# Patient Record
Sex: MALE | Race: WHITE | NOT HISPANIC OR LATINO | Employment: FULL TIME | URBAN - METROPOLITAN AREA
[De-identification: names, ages, dates, MRNs, and addresses within clinical notes are randomized per-mention and may not be internally consistent; named-entity substitution may affect disease eponyms.]

---

## 2017-07-26 ENCOUNTER — HOSPITAL ENCOUNTER (EMERGENCY)
Facility: HOSPITAL | Age: 47
Discharge: HOME | End: 2017-07-26
Attending: EMERGENCY MEDICINE
Payer: COMMERCIAL

## 2017-07-26 VITALS
WEIGHT: 260 LBS | TEMPERATURE: 98 F | RESPIRATION RATE: 12 BRPM | HEIGHT: 73 IN | SYSTOLIC BLOOD PRESSURE: 133 MMHG | OXYGEN SATURATION: 100 % | HEART RATE: 58 BPM | BODY MASS INDEX: 34.46 KG/M2 | DIASTOLIC BLOOD PRESSURE: 96 MMHG

## 2017-07-26 DIAGNOSIS — R07.9 CHEST PAIN: ICD-10-CM

## 2017-07-26 DIAGNOSIS — R07.9 CHEST PAIN, UNSPECIFIED TYPE: Primary | ICD-10-CM

## 2017-07-26 LAB
ALBUMIN SERPL BCP-MCNC: 4.2 G/DL
ALP SERPL-CCNC: 72 U/L
ALT SERPL W/O P-5'-P-CCNC: 31 U/L
ANION GAP SERPL CALC-SCNC: 7 MMOL/L
APTT BLDCRRT: 28.1 SEC
AST SERPL-CCNC: 25 U/L
BASOPHILS # BLD AUTO: 0.02 K/UL
BASOPHILS NFR BLD: 0.4 %
BILIRUB SERPL-MCNC: 0.5 MG/DL
BNP SERPL-MCNC: <10 PG/ML
BUN SERPL-MCNC: 17 MG/DL
CALCIUM SERPL-MCNC: 10.2 MG/DL
CHLORIDE SERPL-SCNC: 105 MMOL/L
CK SERPL-CCNC: 203 U/L
CO2 SERPL-SCNC: 29 MMOL/L
CREAT SERPL-MCNC: 0.8 MG/DL
DIFFERENTIAL METHOD: ABNORMAL
EOSINOPHIL # BLD AUTO: 0.1 K/UL
EOSINOPHIL NFR BLD: 1 %
ERYTHROCYTE [DISTWIDTH] IN BLOOD BY AUTOMATED COUNT: 12.5 %
EST. GFR  (AFRICAN AMERICAN): >60 ML/MIN/1.73 M^2
EST. GFR  (NON AFRICAN AMERICAN): >60 ML/MIN/1.73 M^2
GLUCOSE SERPL-MCNC: 93 MG/DL
HCT VFR BLD AUTO: 39.5 %
HGB BLD-MCNC: 13.6 G/DL
INR PPP: 1
LYMPHOCYTES # BLD AUTO: 2.2 K/UL
LYMPHOCYTES NFR BLD: 44.2 %
MAGNESIUM SERPL-MCNC: 2.1 MG/DL
MCH RBC QN AUTO: 30.9 PG
MCHC RBC AUTO-ENTMCNC: 34.4 G/DL
MCV RBC AUTO: 90 FL
MONOCYTES # BLD AUTO: 0.5 K/UL
MONOCYTES NFR BLD: 10.3 %
NEUTROPHILS # BLD AUTO: 2.2 K/UL
NEUTROPHILS NFR BLD: 44.1 %
PLATELET # BLD AUTO: 196 K/UL
PMV BLD AUTO: 10.3 FL
POTASSIUM SERPL-SCNC: 4.4 MMOL/L
PROT SERPL-MCNC: 7.4 G/DL
PROTHROMBIN TIME: 10.8 SEC
RBC # BLD AUTO: 4.4 M/UL
SODIUM SERPL-SCNC: 141 MMOL/L
TROPONIN I SERPL DL<=0.01 NG/ML-MCNC: 0.01 NG/ML
WBC # BLD AUTO: 4.96 K/UL

## 2017-07-26 PROCEDURE — 83880 ASSAY OF NATRIURETIC PEPTIDE: CPT

## 2017-07-26 PROCEDURE — 25000003 PHARM REV CODE 250: Performed by: EMERGENCY MEDICINE

## 2017-07-26 PROCEDURE — 25000003 PHARM REV CODE 250: Performed by: SPECIALIST

## 2017-07-26 PROCEDURE — 84484 ASSAY OF TROPONIN QUANT: CPT

## 2017-07-26 PROCEDURE — 99284 EMERGENCY DEPT VISIT MOD MDM: CPT | Mod: 25

## 2017-07-26 PROCEDURE — 80053 COMPREHEN METABOLIC PANEL: CPT

## 2017-07-26 PROCEDURE — 83735 ASSAY OF MAGNESIUM: CPT

## 2017-07-26 PROCEDURE — 93010 ELECTROCARDIOGRAM REPORT: CPT | Mod: ,,, | Performed by: INTERNAL MEDICINE

## 2017-07-26 PROCEDURE — 36415 COLL VENOUS BLD VENIPUNCTURE: CPT

## 2017-07-26 PROCEDURE — 82550 ASSAY OF CK (CPK): CPT

## 2017-07-26 PROCEDURE — 93005 ELECTROCARDIOGRAM TRACING: CPT

## 2017-07-26 PROCEDURE — 85730 THROMBOPLASTIN TIME PARTIAL: CPT

## 2017-07-26 PROCEDURE — 85610 PROTHROMBIN TIME: CPT

## 2017-07-26 PROCEDURE — 85025 COMPLETE CBC W/AUTO DIFF WBC: CPT

## 2017-07-26 RX ORDER — ESZOPICLONE 3 MG/1
3 TABLET, FILM COATED ORAL NIGHTLY
COMMUNITY
End: 2017-12-04

## 2017-07-26 RX ORDER — PANTOPRAZOLE SODIUM 20 MG/1
20 TABLET, DELAYED RELEASE ORAL DAILY
Qty: 30 TABLET | Refills: 0 | Status: SHIPPED | OUTPATIENT
Start: 2017-07-26 | End: 2017-12-20

## 2017-07-26 RX ORDER — ASPIRIN 325 MG
325 TABLET ORAL
Status: COMPLETED | OUTPATIENT
Start: 2017-07-26 | End: 2017-07-26

## 2017-07-26 RX ORDER — PHENTERMINE HYDROCHLORIDE 37.5 MG/1
37.5 TABLET ORAL DAILY
COMMUNITY
Start: 2017-07-13 | End: 2019-07-31

## 2017-07-26 RX ADMIN — LIDOCAINE HYDROCHLORIDE 50 ML: 20 SOLUTION ORAL; TOPICAL at 05:07

## 2017-07-26 RX ADMIN — ASPIRIN 325 MG ORAL TABLET 325 MG: 325 PILL ORAL at 04:07

## 2017-07-26 NOTE — ED NOTES
Patient placed on continuous cardiac monitor, automatic blood pressure cuff and continuous pulse oximeter. nsr noted

## 2017-07-26 NOTE — ED PROVIDER NOTES
SCRIBE #1 NOTE: I, Pebbles Martinez, am scribing for, and in the presence of, Jan Iraheta MD. I have scribed the entire note.      History      Chief Complaint   Patient presents with    Chest Pain     Pt reports chest pain since since morning that has been constant        Review of patient's allergies indicates:  No Known Allergies     HPI   HPI    7/26/2017, 4:20 PM   History obtained from the patient      History of Present Illness: Ken Peter is a 47 y.o. male patient who presents to the Emergency Department for acute substernal CP which onset gradually today around 1100 while trying to take a lunch break at work. Pt describes pain as sharp. Pt states that pain intially eased away with sitting but then symptoms became constant with exertion. Sxs are moderate in severity. No mitigating factors reported. Associated sxs include diaphoresis and intermittent hot flashes. Patient denies any SOB, palpitations, extremity weakness/numbness, leg pain/swelling, dizziness, cough, n/v, and all other sxs at this time. No prior Tx reported. No further complaints or concerns at this time.       Arrival mode: Personal vehicle      PCP: Sergio Escamilla MD       Past Medical History:  Past Medical History:   Diagnosis Date    Chronic back pain     Hyperlipidemia     Sleep apnea     Urolithiasis        Past Surgical History:  Past Surgical History:   Procedure Laterality Date    back surgery x8 with instrumentation      bilateral shoulder arthroscopy      ESWL x2 last in 1999           Family History:  Family History   Problem Relation Age of Onset    Diabetes Mother     No Known Problems Father     Colon cancer Neg Hx     Stomach cancer Neg Hx        Social History:  Social History     Social History Main Topics    Smoking status: Former Smoker     Packs/day: 0.50     Years: 20.00     Quit date: 9/30/2002    Smokeless tobacco: Never Used    Alcohol use No    Drug use: No    Sexual activity: Yes       ROS    Review of Systems   Constitutional: Positive for diaphoresis. Negative for fever.        + intermittent hot flashes   HENT: Negative for sore throat.    Respiratory: Negative for cough and shortness of breath.    Cardiovascular: Positive for chest pain. Negative for palpitations and leg swelling.   Gastrointestinal: Negative for nausea and vomiting.   Genitourinary: Negative for dysuria.   Musculoskeletal: Negative for back pain.        - leg pain    Skin: Negative for rash.   Neurological: Negative for dizziness, weakness and numbness.   Hematological: Does not bruise/bleed easily.     Physical Exam      Initial Vitals [07/26/17 1524]   BP Pulse Resp Temp SpO2   (!) 151/89 77 16 97.9 °F (36.6 °C) 98 %      MAP       109.67          Physical Exam  Nursing Notes and Vital Signs Reviewed.  Constitutional: Patient is in no apparent distress. Well-developed and well-nourished.  Head: Atraumatic. Normocephalic.  Eyes: PERRL. EOM intact. Conjunctivae are not pale. No scleral icterus.  ENT: Mucous membranes are moist. Oropharynx is clear and symmetric.    Neck: Supple. Full ROM. No lymphadenopathy.  Cardiovascular: Regular rate. Regular rhythm. No murmurs, rubs, or gallops. Distal pulses are 2+ and symmetric.  Pulmonary/Chest: No respiratory distress. Clear to auscultation bilaterally. No wheezing, rales, or rhonchi.  Abdominal: Soft and non-distended.  There is no tenderness.  No rebound, guarding, or rigidity. Good bowel sounds.  Genitourinary: No CVA tenderness  Musculoskeletal: Moves all extremities. No obvious deformities. No edema. No calf tenderness.  Skin: Warm and dry.  Neurological:  Alert, awake, and appropriate.  Normal speech.  No acute focal neurological deficits are appreciated.  Psychiatric: Normal affect. Good eye contact. Appropriate in content.    ED Course    Procedures  ED Vital Signs:  Vitals:    07/26/17 1524 07/26/17 1850   BP: (!) 151/89 (!) 133/96   Pulse: 77 (!) 58   Resp: 16 12   Temp: 97.9 °F  "(36.6 °C)    TempSrc: Oral    SpO2: 98% 100%   Weight: 117.9 kg (260 lb)    Height: 6' 1" (1.854 m)        Abnormal Lab Results:  Labs Reviewed   CBC W/ AUTO DIFFERENTIAL - Abnormal; Notable for the following:        Result Value    RBC 4.40 (*)     Hemoglobin 13.6 (*)     Hematocrit 39.5 (*)     All other components within normal limits   COMPREHENSIVE METABOLIC PANEL - Abnormal; Notable for the following:     Anion Gap 7 (*)     All other components within normal limits   CK - Abnormal; Notable for the following:      (*)     All other components within normal limits   MAGNESIUM   TROPONIN I   PROTIME-INR   APTT   B-TYPE NATRIURETIC PEPTIDE        All Lab Results:  Results for orders placed or performed during the hospital encounter of 07/26/17   CBC auto differential   Result Value Ref Range    WBC 4.96 3.90 - 12.70 K/uL    RBC 4.40 (L) 4.60 - 6.20 M/uL    Hemoglobin 13.6 (L) 14.0 - 18.0 g/dL    Hematocrit 39.5 (L) 40.0 - 54.0 %    MCV 90 82 - 98 fL    MCH 30.9 27.0 - 31.0 pg    MCHC 34.4 32.0 - 36.0 g/dL    RDW 12.5 11.5 - 14.5 %    Platelets 196 150 - 350 K/uL    MPV 10.3 9.2 - 12.9 fL    Gran # 2.2 1.8 - 7.7 K/uL    Lymph # 2.2 1.0 - 4.8 K/uL    Mono # 0.5 0.3 - 1.0 K/uL    Eos # 0.1 0.0 - 0.5 K/uL    Baso # 0.02 0.00 - 0.20 K/uL    Gran% 44.1 38.0 - 73.0 %    Lymph% 44.2 18.0 - 48.0 %    Mono% 10.3 4.0 - 15.0 %    Eosinophil% 1.0 0.0 - 8.0 %    Basophil% 0.4 0.0 - 1.9 %    Differential Method Automated    Comprehensive metabolic panel   Result Value Ref Range    Sodium 141 136 - 145 mmol/L    Potassium 4.4 3.5 - 5.1 mmol/L    Chloride 105 95 - 110 mmol/L    CO2 29 23 - 29 mmol/L    Glucose 93 70 - 110 mg/dL    BUN, Bld 17 6 - 20 mg/dL    Creatinine 0.8 0.5 - 1.4 mg/dL    Calcium 10.2 8.7 - 10.5 mg/dL    Total Protein 7.4 6.0 - 8.4 g/dL    Albumin 4.2 3.5 - 5.2 g/dL    Total Bilirubin 0.5 0.1 - 1.0 mg/dL    Alkaline Phosphatase 72 55 - 135 U/L    AST 25 10 - 40 U/L    ALT 31 10 - 44 U/L    Anion Gap 7 " (L) 8 - 16 mmol/L    eGFR if African American >60 >60 mL/min/1.73 m^2    eGFR if non African American >60 >60 mL/min/1.73 m^2   Magnesium   Result Value Ref Range    Magnesium 2.1 1.6 - 2.6 mg/dL   CK   Result Value Ref Range     (H) 20 - 200 U/L   Troponin I   Result Value Ref Range    Troponin I 0.008 0.000 - 0.026 ng/mL   Protime-INR   Result Value Ref Range    Prothrombin Time 10.8 9.0 - 12.5 sec    INR 1.0 0.8 - 1.2   APTT   Result Value Ref Range    aPTT 28.1 21.0 - 32.0 sec   Brain natriuretic peptide   Result Value Ref Range    BNP <10 0 - 99 pg/mL         Imaging Results:  Imaging Results          X-Ray Chest 1 View (Final result)  Result time 07/26/17 16:01:47    Final result by Curtis Medina MD (07/26/17 16:01:47)                 Impression:      No acute findings.      Electronically signed by: CURTIS MEDINA MD  Date:     07/26/17  Time:    16:01              Narrative:    Exam: XR CHEST 1 VIEW,    Date:  07/26/17 15:59:19    History: Acute chest pain    Comparison:  No prior relevant studies available    Findings: Postoperative changes of the cervical spine are noted.    The heart size is normal.  Lung fields are clear.                             The EKG was ordered, reviewed, and independently interpreted by the ED provider.  Interpretation time: 1555  Rate: 65 BPM  Rhythm: normal sinus rhythm  Interpretation: Possible L artrial enlargement. L ventricular hypertrophy. No STEMI.             The Emergency Provider reviewed the vital signs and test results, which are outlined above.    ED Discussion   Pre-hypertension/Hypertension: The pt has been informed that they may have pre-hypertension or hypertension based on a blood pressure reading in the ED. I recommend that the pt call the PCP listed on their discharge instructions or a physician of their choice this week to arrange f/u for further evaluation of possible pre-hypertension or hypertension.     6:09 PM: Reassessed pt at this  time.  Pt states his condition has improved at this time after GI cocktail. Discussed with pt all pertinent ED information and results. Discussed pt dx  and plan of tx. Gave pt all f/u and return to the ED instructions. All questions and concerns were addressed at this time. Pt expresses understanding of information and instructions, and is comfortable with plan to discharge. Pt is stable for discharge.    I have discussed with patient and/or family/caretaker chest pain precautions, specifically to return for worsening chest pain, shortness of breath, fever, or any concern.  I have low suspicion for cardiopulmonary, vascular, infectious, respiratory, or other emergent medical condition based on my evaluation in the ED.      ED Medication(s):  Medications   aspirin tablet 325 mg (325 mg Oral Given 7/26/17 1612)   GI cocktail (mylanta 30 mL, lidocaine 2 % viscous 10 mL, dicyclomine 10 mL) 50 mL (50 mLs Oral Given 7/26/17 5407)       Discharge Medication List as of 7/26/2017  6:16 PM      START taking these medications    Details   pantoprazole (PROTONIX) 20 MG tablet Take 1 tablet (20 mg total) by mouth once daily., Starting Wed 7/26/2017, Until Thu 7/26/2018, Print             Follow-up Information     Sergio Escamilla MD In 2 days.    Specialty:  Family Medicine  Contact information:  [address] [facility] [address] [phone]             [facility] - .    Specialty:  Emergency Medicine  Why:  If symptoms worsen  Contact information:  [address] [facility] [address] [phone]                   Medical Decision Making    Medical Decision Making:   Clinical Tests:   Lab Tests: Reviewed and Ordered  Radiological Study: Reviewed and Ordered  Medical Tests: Reviewed and Ordered           Scribe Attestation:   Scribe #1: I performed the above scribed service and the documentation accurately describes the services I performed. I attest to the accuracy of  the note.    Attending:   Physician Attestation Statement for Scribe #1: I, Jan Iraheta MD, personally performed the services described in this documentation, as scribed by Pebbles Martinez, in my presence, and it is both accurate and complete.          Clinical Impression       ICD-10-CM ICD-9-CM   1. Chest pain, unspecified type R07.9 786.50   2. Chest pain R07.9 786.50       Disposition:   Disposition: Discharged  Condition: Stable         Jan Iraheta MD  07/26/17 9137

## 2017-12-04 ENCOUNTER — HOSPITAL ENCOUNTER (EMERGENCY)
Facility: HOSPITAL | Age: 47
Discharge: HOME | End: 2017-12-04
Attending: EMERGENCY MEDICINE
Payer: COMMERCIAL

## 2017-12-04 VITALS
SYSTOLIC BLOOD PRESSURE: 134 MMHG | WEIGHT: 265 LBS | HEIGHT: 73 IN | DIASTOLIC BLOOD PRESSURE: 71 MMHG | HEART RATE: 60 BPM | RESPIRATION RATE: 20 BRPM | BODY MASS INDEX: 35.12 KG/M2 | TEMPERATURE: 99 F | OXYGEN SATURATION: 97 %

## 2017-12-04 DIAGNOSIS — K65.4: Primary | ICD-10-CM

## 2017-12-04 DIAGNOSIS — K59.00 CONSTIPATION, UNSPECIFIED CONSTIPATION TYPE: ICD-10-CM

## 2017-12-04 DIAGNOSIS — R10.9 ABDOMINAL PAIN, UNSPECIFIED ABDOMINAL LOCATION: ICD-10-CM

## 2017-12-04 DIAGNOSIS — M54.50 RIGHT-SIDED LOW BACK PAIN WITHOUT SCIATICA, UNSPECIFIED CHRONICITY: ICD-10-CM

## 2017-12-04 LAB
ALBUMIN SERPL BCP-MCNC: 4.2 G/DL
ALP SERPL-CCNC: 53 U/L
ALT SERPL W/O P-5'-P-CCNC: 29 U/L
ANION GAP SERPL CALC-SCNC: 7 MMOL/L
AST SERPL-CCNC: 22 U/L
BASOPHILS # BLD AUTO: 0.02 K/UL
BASOPHILS NFR BLD: 0.3 %
BILIRUB SERPL-MCNC: 0.5 MG/DL
BILIRUB UR QL STRIP: NEGATIVE
BUN SERPL-MCNC: 18 MG/DL
CALCIUM SERPL-MCNC: 9.6 MG/DL
CHLORIDE SERPL-SCNC: 106 MMOL/L
CLARITY UR: CLEAR
CO2 SERPL-SCNC: 27 MMOL/L
COLOR UR: YELLOW
CREAT SERPL-MCNC: 0.9 MG/DL
DIFFERENTIAL METHOD: ABNORMAL
EOSINOPHIL # BLD AUTO: 0.1 K/UL
EOSINOPHIL NFR BLD: 1 %
ERYTHROCYTE [DISTWIDTH] IN BLOOD BY AUTOMATED COUNT: 13.1 %
EST. GFR  (AFRICAN AMERICAN): >60 ML/MIN/1.73 M^2
EST. GFR  (NON AFRICAN AMERICAN): >60 ML/MIN/1.73 M^2
GLUCOSE SERPL-MCNC: 84 MG/DL
GLUCOSE UR QL STRIP: NEGATIVE
HCT VFR BLD AUTO: 41.1 %
HGB BLD-MCNC: 13.9 G/DL
HGB UR QL STRIP: NEGATIVE
KETONES UR QL STRIP: ABNORMAL
LEUKOCYTE ESTERASE UR QL STRIP: NEGATIVE
LIPASE SERPL-CCNC: 40 U/L
LYMPHOCYTES # BLD AUTO: 2.5 K/UL
LYMPHOCYTES NFR BLD: 41.3 %
MCH RBC QN AUTO: 30.6 PG
MCHC RBC AUTO-ENTMCNC: 33.8 G/DL
MCV RBC AUTO: 91 FL
MONOCYTES # BLD AUTO: 0.6 K/UL
MONOCYTES NFR BLD: 9.5 %
NEUTROPHILS # BLD AUTO: 2.9 K/UL
NEUTROPHILS NFR BLD: 47.9 %
NITRITE UR QL STRIP: NEGATIVE
PH UR STRIP: 6 [PH] (ref 5–8)
PLATELET # BLD AUTO: 201 K/UL
PMV BLD AUTO: 10.1 FL
POTASSIUM SERPL-SCNC: 4.2 MMOL/L
PROT SERPL-MCNC: 7.2 G/DL
PROT UR QL STRIP: NEGATIVE
RBC # BLD AUTO: 4.54 M/UL
SODIUM SERPL-SCNC: 140 MMOL/L
SP GR UR STRIP: 1.02 (ref 1–1.03)
URN SPEC COLLECT METH UR: ABNORMAL
UROBILINOGEN UR STRIP-ACNC: 1 EU/DL
WBC # BLD AUTO: 6.1 K/UL

## 2017-12-04 PROCEDURE — 96374 THER/PROPH/DIAG INJ IV PUSH: CPT

## 2017-12-04 PROCEDURE — 96376 TX/PRO/DX INJ SAME DRUG ADON: CPT

## 2017-12-04 PROCEDURE — 80053 COMPREHEN METABOLIC PANEL: CPT

## 2017-12-04 PROCEDURE — 96375 TX/PRO/DX INJ NEW DRUG ADDON: CPT

## 2017-12-04 PROCEDURE — 99285 EMERGENCY DEPT VISIT HI MDM: CPT | Mod: 25

## 2017-12-04 PROCEDURE — 85025 COMPLETE CBC W/AUTO DIFF WBC: CPT

## 2017-12-04 PROCEDURE — 63600175 PHARM REV CODE 636 W HCPCS: Performed by: EMERGENCY MEDICINE

## 2017-12-04 PROCEDURE — 83690 ASSAY OF LIPASE: CPT

## 2017-12-04 PROCEDURE — 81003 URINALYSIS AUTO W/O SCOPE: CPT

## 2017-12-04 RX ORDER — MORPHINE SULFATE 2 MG/ML
4 INJECTION, SOLUTION INTRAMUSCULAR; INTRAVENOUS
Status: COMPLETED | OUTPATIENT
Start: 2017-12-04 | End: 2017-12-04

## 2017-12-04 RX ORDER — ONDANSETRON 2 MG/ML
4 INJECTION INTRAMUSCULAR; INTRAVENOUS
Status: COMPLETED | OUTPATIENT
Start: 2017-12-04 | End: 2017-12-04

## 2017-12-04 RX ORDER — ZOLPIDEM TARTRATE 12.5 MG/1
12.5 TABLET, FILM COATED, EXTENDED RELEASE ORAL NIGHTLY PRN
COMMUNITY
Start: 2017-10-24 | End: 2019-07-31

## 2017-12-04 RX ORDER — POLYETHYLENE GLYCOL 3350 17 G/17G
17 POWDER, FOR SOLUTION ORAL DAILY
Qty: 238 G | Refills: 0 | Status: SHIPPED | OUTPATIENT
Start: 2017-12-04 | End: 2019-07-31

## 2017-12-04 RX ORDER — DOCUSATE SODIUM 100 MG/1
100 CAPSULE, LIQUID FILLED ORAL 2 TIMES DAILY PRN
Qty: 30 CAPSULE | Refills: 0 | Status: SHIPPED | OUTPATIENT
Start: 2017-12-04 | End: 2019-07-31

## 2017-12-04 RX ORDER — NAPROXEN 500 MG/1
500 TABLET ORAL 2 TIMES DAILY WITH MEALS
Qty: 10 TABLET | Refills: 0 | Status: ON HOLD | OUTPATIENT
Start: 2017-12-04 | End: 2018-03-20 | Stop reason: HOSPADM

## 2017-12-04 RX ORDER — GABAPENTIN 400 MG/1
400 CAPSULE ORAL 3 TIMES DAILY
COMMUNITY
Start: 2017-05-10

## 2017-12-04 RX ORDER — KETOROLAC TROMETHAMINE 30 MG/ML
30 INJECTION, SOLUTION INTRAMUSCULAR; INTRAVENOUS
Status: COMPLETED | OUTPATIENT
Start: 2017-12-04 | End: 2017-12-04

## 2017-12-04 RX ADMIN — Medication 4 MG: at 04:12

## 2017-12-04 RX ADMIN — Medication 4 MG: at 08:12

## 2017-12-04 RX ADMIN — KETOROLAC TROMETHAMINE 30 MG: 30 INJECTION, SOLUTION INTRAMUSCULAR at 07:12

## 2017-12-04 RX ADMIN — ONDANSETRON HYDROCHLORIDE 4 MG: 2 INJECTION, SOLUTION INTRAMUSCULAR; INTRAVENOUS at 04:12

## 2017-12-04 NOTE — ED PROVIDER NOTES
"SCRIBE #1 NOTE: I, Corinne Mack, am scribing for, and in the presence of, Elpidio Sanchez Jr., MD. I have scribed the entire note.      History      Chief Complaint   Patient presents with    Flank Pain     Pt states, "I am having pain in my right flank and I am also having pain in thr left upper abdomen."       Review of patient's allergies indicates:  No Known Allergies     HPI   HPI    12/4/2017, 4:21 PM   History obtained from the patient      History of Present Illness: Ken Peter is a 47 y.o. male patient who presents to the Emergency Department for R flank pain which onset suddenly this morning. Symptoms are constant and moderate in severity. Pt reports having L upper abd which onset 2 days ago. No mitigating or exacerbating factors reported. Associated sxs include nausea. Patient denies any fever, chills, CP, SOB, back pain, neck pain, dysuria, hematuria, difficulty urinating, HA, dizziness, and all other sxs at this time. No prior Tx reported. Pt has Hx of urolithiasis. No further complaints or concerns at this time.         Arrival mode: Personal vehicle      PCP: Sergio Escamilla MD       Past Medical History:  Past Medical History:   Diagnosis Date    Chronic back pain     Hyperlipidemia     Sleep apnea     Urolithiasis        Past Surgical History:  Past Surgical History:   Procedure Laterality Date    back surgery x8 with instrumentation      bilateral shoulder arthroscopy      ESWL x2 last in 1999           Family History:  Family History   Problem Relation Age of Onset    Diabetes Mother     No Known Problems Father     Colon cancer Neg Hx     Stomach cancer Neg Hx        Social History:  Social History     Social History Main Topics    Smoking status: Former Smoker     Packs/day: 0.50     Years: 20.00     Quit date: 9/30/2002    Smokeless tobacco: Never Used    Alcohol use No    Drug use: No    Sexual activity: Yes       ROS   Review of Systems   Constitutional: Negative for " chills and fever.   Respiratory: Negative for cough and shortness of breath.    Cardiovascular: Negative for chest pain and leg swelling.   Gastrointestinal: Positive for abdominal pain and nausea. Negative for diarrhea and vomiting.   Genitourinary: Positive for flank pain. Negative for difficulty urinating, dysuria and hematuria.   Musculoskeletal: Negative for back pain, neck pain and neck stiffness.   Skin: Negative for rash and wound.   Neurological: Negative for dizziness, light-headedness, numbness and headaches.   All other systems reviewed and are negative.    Physical Exam      Initial Vitals [12/04/17 1536]   BP Pulse Resp Temp SpO2   (!) 151/91 74 20 98 °F (36.7 °C) 97 %      MAP       111          Physical Exam  Nursing Notes and Vital Signs Reviewed.  Constitutional: Patient is in no apparent distress. Well-developed and well-nourished.  Head: Atraumatic. Normocephalic.  Eyes: PERRL. EOM intact. Conjunctivae are not pale. No scleral icterus.  ENT: Mucous membranes are moist. Oropharynx is clear and symmetric.    Neck: Supple. Full ROM. No lymphadenopathy.  Cardiovascular: Regular rate. Regular rhythm. No murmurs, rubs, or gallops. Distal pulses are 2+ and symmetric.  Pulmonary/Chest: No respiratory distress. Clear to auscultation bilaterally. No wheezing or rales.  Abdominal: Soft and non-distended.  There is L upper abd tenderness.  No rebound, guarding, or rigidity.  Genitourinary: R CVA tenderness  Musculoskeletal: Moves all extremities. No obvious deformities. R lumbar pain that radiates to the abd.  Skin: Warm and dry.  Neurological:  Alert, awake, and appropriate.  Normal speech.  No acute focal neurological deficits are appreciated.  Psychiatric: Normal affect. Good eye contact. Appropriate in content.    ED Course    Procedures  ED Vital Signs:  Vitals:    12/04/17 1536 12/04/17 1655   BP: (!) 151/91 121/87   Pulse: 74 64   Resp: 20    Temp: 98 °F (36.7 °C)    TempSrc: Oral    SpO2: 97% 96%  "  Weight: 120.2 kg (265 lb)    Height: 6' 1" (1.854 m)        Abnormal Lab Results:  Labs Reviewed   URINALYSIS - Abnormal; Notable for the following:        Result Value    Ketones, UA Trace (*)     All other components within normal limits   CBC W/ AUTO DIFFERENTIAL - Abnormal; Notable for the following:     RBC 4.54 (*)     Hemoglobin 13.9 (*)     All other components within normal limits   COMPREHENSIVE METABOLIC PANEL - Abnormal; Notable for the following:     Alkaline Phosphatase 53 (*)     Anion Gap 7 (*)     All other components within normal limits   LIPASE        All Lab Results:  Results for orders placed or performed during the hospital encounter of 12/04/17   Urinalysis Clean Catch   Result Value Ref Range    Specimen UA Urine, Clean Catch     Color, UA Yellow Yellow, Straw, Jennifer    Appearance, UA Clear Clear    pH, UA 6.0 5.0 - 8.0    Specific Gravity, UA 1.025 1.005 - 1.030    Protein, UA Negative Negative    Glucose, UA Negative Negative    Ketones, UA Trace (A) Negative    Bilirubin (UA) Negative Negative    Occult Blood UA Negative Negative    Nitrite, UA Negative Negative    Urobilinogen, UA 1.0 <2.0 EU/dL    Leukocytes, UA Negative Negative   CBC auto differential   Result Value Ref Range    WBC 6.10 3.90 - 12.70 K/uL    RBC 4.54 (L) 4.60 - 6.20 M/uL    Hemoglobin 13.9 (L) 14.0 - 18.0 g/dL    Hematocrit 41.1 40.0 - 54.0 %    MCV 91 82 - 98 fL    MCH 30.6 27.0 - 31.0 pg    MCHC 33.8 32.0 - 36.0 g/dL    RDW 13.1 11.5 - 14.5 %    Platelets 201 150 - 350 K/uL    MPV 10.1 9.2 - 12.9 fL    Gran # 2.9 1.8 - 7.7 K/uL    Lymph # 2.5 1.0 - 4.8 K/uL    Mono # 0.6 0.3 - 1.0 K/uL    Eos # 0.1 0.0 - 0.5 K/uL    Baso # 0.02 0.00 - 0.20 K/uL    Gran% 47.9 38.0 - 73.0 %    Lymph% 41.3 18.0 - 48.0 %    Mono% 9.5 4.0 - 15.0 %    Eosinophil% 1.0 0.0 - 8.0 %    Basophil% 0.3 0.0 - 1.9 %    Differential Method Automated    Comprehensive metabolic panel   Result Value Ref Range    Sodium 140 136 - 145 mmol/L    " Potassium 4.2 3.5 - 5.1 mmol/L    Chloride 106 95 - 110 mmol/L    CO2 27 23 - 29 mmol/L    Glucose 84 70 - 110 mg/dL    BUN, Bld 18 6 - 20 mg/dL    Creatinine 0.9 0.5 - 1.4 mg/dL    Calcium 9.6 8.7 - 10.5 mg/dL    Total Protein 7.2 6.0 - 8.4 g/dL    Albumin 4.2 3.5 - 5.2 g/dL    Total Bilirubin 0.5 0.1 - 1.0 mg/dL    Alkaline Phosphatase 53 (L) 55 - 135 U/L    AST 22 10 - 40 U/L    ALT 29 10 - 44 U/L    Anion Gap 7 (L) 8 - 16 mmol/L    eGFR if African American >60 >60 mL/min/1.73 m^2    eGFR if non African American >60 >60 mL/min/1.73 m^2   Lipase   Result Value Ref Range    Lipase 40 4 - 60 U/L       Imaging Results:  Imaging Results          CT Renal Stone Study Abd Pelvis WO (Final result)  Result time 12/04/17 19:14:40    Final result by VANDA Amos Sr., MD (12/04/17 19:14:40)                 Impression:      1. There is a 2 cm area of fat necrosis along the anterior medial aspect of the ascending portion of the colon.   2. There are 4 lumbar-type vertebral bodies. There is a transitional vertebral body between L4 and the sacrum. There is surgical hardware between L3 and the transitional vertebral body.  3. There are mild dependent atelectatic changes in both lungs.      All CT scans at this facility use dose modulation, iterative reconstruction, and/or weight base dosing when appropriate to reduce radiation dose when appropriate to reduce radiation dose to as low as reasonably achievable.      Electronically signed by: VANDA AMOS MD  Date:     12/04/17  Time:    19:14              Narrative:    CT of abdomen and pelvis without IV Contrast    History: Right flank pain    Technique: Standard abdomen and pelvis CT protocol without oral or IV contrast was performed.    Finding: Comparison is made to prior examination performed on 9/3/2014. The size of the heart is within normal limits. There are mild dependent atelectatic changes in both lungs. There is no pneumothorax or pleural effusion.    The liver,  gallbladder, pancreas, spleen, adrenals, and kidneys are normal in appearance. The ureters and urinary bladder are normal in appearance. The appendix and the rest of the gastrointestinal system are normal in appearance. The prostate is normal in appearance. There is no free fluid within the abdomen or pelvis. There is no pneumoperitoneum. There is a 2 cm area of fat necrosis along the anterior medial aspect of the ascending portion of the colon. There are 4 lumbar-type vertebral bodies. There is a transitional vertebral body between L4 and the sacrum. There is surgical hardware between L3 and the transitional vertebral body.                                      The Emergency Provider reviewed the vital signs and test results, which are outlined above.    ED Discussion     7:34 PM: Dr. Sanchez discussed the pt's case with Dr. Morfin (Gastroenterology) who recommends f/u as out-patient.    7:38 PM: Reassessed pt at this time. Pt is awake, alert, and in no distress. Discussed with pt all pertinent ED information and results. Discussed pt dx and plan of tx. Gave pt all f/u and return to the ED instructions. All questions and concerns were addressed at this time. Pt expresses understanding of information and instructions, and is comfortable with plan to discharge. Pt is stable for discharge.    I discussed with patient and/or family/caretaker that evaluation in the ED does not suggest any emergent or life threatening medical conditions requiring immediate intervention beyond what was provided in the ED, and I believe patient is safe for discharge.  Regardless, an unremarkable evaluation in the ED does not preclude the development or presence of a serious of life threatening condition. As such, patient was instructed to return immediately for any worsening or change in current symptoms.      ED Medication(s):  Medications   morphine injection 4 mg (4 mg Intravenous Given 12/4/17 6650)   ondansetron injection 4 mg (4 mg  Intravenous Given 12/4/17 1631)       New Prescriptions    DOCUSATE SODIUM (COLACE) 100 MG CAPSULE    Take 1 capsule (100 mg total) by mouth 2 (two) times daily as needed for Constipation.    NAPROXEN (NAPROSYN) 500 MG TABLET    Take 1 tablet (500 mg total) by mouth 2 (two) times daily with meals. Pain    POLYETHYLENE GLYCOL (GLYCOLAX) 17 GRAM/DOSE POWDER    Take 17 g by mouth once daily. Prn constipation       Follow-up Information     Dior Morfin MD. Call in 1 day.    Specialty:  Gastroenterology  Why:  to schedule appt for recheck of your abdomen [facility] Dr. Morfin- GI specialist here at [facility]  Contact information:  [address] [place] [address] DR Charmaine PETE [address] [phone]                     Medical Decision Making    Medical Decision Making:   Clinical Tests:   Lab Tests: Ordered and Reviewed  Radiological Study: Ordered and Reviewed           Scribe Attestation:   Scribe #1: I performed the above scribed service and the documentation accurately describes the services I performed. I attest to the accuracy of the note.    Attending:   Physician Attestation Statement for Scribe #1: I, Elpidio Sanchez Jr., MD, personally performed the services described in this documentation, as scribed by Corinne Mack, in my presence, and it is both accurate and complete.          Clinical Impression       ICD-10-CM ICD-9-CM   1. Fat necrosis of mesentery K65.8 567.82   2. Abdominal pain, unspecified abdominal location R10.9 789.00   3. Constipation, unspecified constipation type K59.00 564.00   4. Right-sided low back pain without sciatica, unspecified chronicity M54.5 724.2       Disposition:   Disposition: Discharged  Condition: Stable         Elpidio Sanchez Jr., MD  12/04/17 2013

## 2017-12-04 NOTE — ED NOTES
"Patient instructed to ring for assistance after pain medication as it increases risk for fall, verbalized understanding. Patient"s ride confirmed prior to administering pain medication as required. Wife  will be picking patient up as patient as been instructed they are unable to drive for 4 hours after administration of narcotics.  Bed in low position, side rails up x 2 and call bell in reach.  "

## 2017-12-20 ENCOUNTER — OFFICE VISIT (OUTPATIENT)
Dept: GASTROENTEROLOGY | Facility: CLINIC | Age: 47
End: 2017-12-20
Payer: COMMERCIAL

## 2017-12-20 ENCOUNTER — HOSPITAL ENCOUNTER (OUTPATIENT)
Dept: RADIOLOGY | Facility: HOSPITAL | Age: 47
Discharge: HOME | End: 2017-12-20
Attending: NURSE PRACTITIONER
Payer: COMMERCIAL

## 2017-12-20 VITALS
HEIGHT: 73 IN | HEART RATE: 68 BPM | DIASTOLIC BLOOD PRESSURE: 78 MMHG | WEIGHT: 266.75 LBS | BODY MASS INDEX: 35.35 KG/M2 | SYSTOLIC BLOOD PRESSURE: 144 MMHG

## 2017-12-20 DIAGNOSIS — R10.12 LUQ ABDOMINAL PAIN: ICD-10-CM

## 2017-12-20 DIAGNOSIS — R07.89 ATYPICAL CHEST PAIN: ICD-10-CM

## 2017-12-20 DIAGNOSIS — K59.03 DRUG-INDUCED CONSTIPATION: ICD-10-CM

## 2017-12-20 DIAGNOSIS — K65.4: Primary | ICD-10-CM

## 2017-12-20 PROCEDURE — 74020 XR ABDOMEN FLAT AND ERECT: CPT | Mod: 26,,, | Performed by: RADIOLOGY

## 2017-12-20 PROCEDURE — 99999 PR PBB SHADOW E&M-EST. PATIENT-LVL III: CPT | Mod: PBBFAC,,, | Performed by: NURSE PRACTITIONER

## 2017-12-20 PROCEDURE — 99204 OFFICE O/P NEW MOD 45 MIN: CPT | Mod: S$GLB,,, | Performed by: NURSE PRACTITIONER

## 2017-12-20 PROCEDURE — 74020 XR ABDOMEN FLAT AND ERECT: CPT | Mod: TC

## 2017-12-20 RX ORDER — PANTOPRAZOLE SODIUM 40 MG/1
40 TABLET, DELAYED RELEASE ORAL DAILY
Qty: 30 TABLET | Refills: 11 | Status: SHIPPED | OUTPATIENT
Start: 2017-12-20 | End: 2019-07-31

## 2017-12-20 NOTE — Clinical Note
Patient with fat necrosis of mesentery at ascending colon per CT scan in ER. Still having abdominal pain. I have concerns that CT findings are not related to his symptoms and could be just incidental. I am working up other etiologies. Any additional interventions/treatment need to be done related to fat necrosis?

## 2017-12-22 NOTE — PROGRESS NOTES
Clinic Consult:  [facility] Consultation Note    Reason for Consult:  The primary encounter diagnosis was Fat necrosis of mesentery. Diagnoses of LUQ abdominal pain, Drug-induced constipation, and Atypical chest pain were also pertinent to this visit.    PCP: Sergio Escamilla       HPI:  This is a 47 y.o. male here for hospital ER follow up. He presented to [facility] ER on 12/4/17 abdominal pain. Abdominal pain involves the entire upper abdomen but is significantly worse on his LUQ. He admits his abdominal pain has improved some since discharge but still reports having some intense LUQ pain at times and upper abdominal burning. Workup in the ER included labs and CT scan.  CBC, CMP, Lipase unrevealing. CT scan showed a 2cm area of fat necrosis along the anterior medial aspect of the ascending colon as well as chronic findings of his spine. He has had several back and neck surgeries and is on daily Narcotic pain medications. He has been on Miralax since his ER visit. He also complains of chest pain after eating. He takes Naproxen twice a day every day. No reports of hematochezia, melena, nausea, vomiting, or weight loss.     Review of Systems   Constitutional: Negative for fever, malaise/fatigue and weight loss.   HENT: Negative for sore throat.    Respiratory: Negative for cough and wheezing.    Cardiovascular: Positive for chest pain. Negative for palpitations.   Gastrointestinal: Positive for abdominal pain and constipation. Negative for blood in stool, diarrhea, heartburn, melena, nausea and vomiting.   Musculoskeletal: Negative for back pain, joint pain, myalgias and neck pain.   Skin: Negative for itching and rash.   Neurological: Negative for dizziness, speech change, seizures, loss of consciousness and headaches.   Psychiatric/Behavioral: Negative for depression and substance abuse. The patient is not nervous/anxious.        Medical History:  has a past medical history of Chronic back pain;  Hyperlipidemia; Sleep apnea; and Urolithiasis.    Surgical History:  has a past surgical history that includes back surgery x8 with instrumentation; bilateral shoulder arthroscopy; and ESWL x2 last in 1999.    Family History: family history includes Diabetes in his mother; No Known Problems in his father..     Social History:  reports that he quit smoking about 15 years ago. He has a 10.00 pack-year smoking history. He has never used smokeless tobacco. He reports that he does not drink alcohol or use drugs.    Allergies: Reviewed    Home Medications:   Current Outpatient Prescriptions on File Prior to Visit   Medication Sig Dispense Refill    aspirin (ECOTRIN) 81 MG EC tablet Take 81 mg by mouth once daily.      docusate sodium (COLACE) 100 MG capsule Take 1 capsule (100 mg total) by mouth 2 (two) times daily as needed for Constipation. 30 capsule 0    fluticasone (FLONASE) 50 mcg/actuation nasal spray       gabapentin (NEURONTIN) 400 MG capsule Take 400 mg by mouth 3 (three) times daily.      naproxen (NAPROSYN) 500 MG tablet Take 1 tablet (500 mg total) by mouth 2 (two) times daily with meals. Pain 10 tablet 0    oxycodone-acetaminophen (PERCOCET)  mg per tablet Take 1 tablet by mouth 3 (three) times daily as needed for Pain.      polyethylene glycol (GLYCOLAX) 17 gram/dose powder Take 17 g by mouth once daily. Prn constipation 238 g 0    pravastatin (PRAVACHOL) 40 MG tablet Take 40 mg by mouth once daily.  5    zolpidem (AMBIEN CR) 12.5 MG CR tablet Take 12.5 mg by mouth nightly as needed.      armodafinil (NUVIGIL) 150 mg tablet Take 150 mg by mouth once daily.      lubiprostone (AMITIZA) 24 MCG Cap Take 1 capsule (24 mcg total) by mouth 2 (two) times daily. 60 capsule 11    methocarbamol (ROBAXIN) 500 MG Tab Take 500 mg by mouth nightly.      phentermine (ADIPEX-P) 37.5 mg tablet Take 37.5 mg by mouth once daily.      tadalafil (CIALIS) 20 MG Tab Take 1 tablet (20 mg total) by mouth once  "daily. 5 tablet 11     No current facility-administered medications on file prior to visit.        Physical Exam:  Vital Signs:  BP (!) 144/78   Pulse 68   Ht 6' 1" (1.854 m)   Wt 121 kg (266 lb 12.1 oz)   BMI 35.19 kg/m²   Body mass index is 35.19 kg/m².  Physical Exam   Constitutional: He is oriented to person, place, and time and well-developed, well-nourished, and in no distress. No distress.   HENT:   Head: Normocephalic.   Eyes: Conjunctivae are normal. Pupils are equal, round, and reactive to light.   Cardiovascular: Normal rate, regular rhythm and normal heart sounds.    Pulmonary/Chest: Effort normal and breath sounds normal. No respiratory distress.   Abdominal: Soft. Bowel sounds are normal. He exhibits no distension. There is no tenderness.   Neurological: He is alert and oriented to person, place, and time. No cranial nerve deficit.   Skin: Skin is warm and dry. No rash noted.   Psychiatric: Mood and affect normal.       Labs: Pertinent labs reviewed.    CRC Screening: up to date    Assessment:  1. Fat necrosis of mesentery    2. LUQ abdominal pain    3. Drug-induced constipation    4. Atypical chest pain           Recommendations:    - CT scan showing fat necrosis of mesentery.   - He still complains of same pain but slightly improved.   - Unclear if CT findings are related to patient's symptoms or if they were an incidental finding. Abnormalities on CT scan areon right side of abdomen, however most of his complaints are pretty localized and on the left.   - Will evaluate other causes of abdominal pain.  - Possible gastritis vs other UGI symptoms. Start on daily PPI  - Has history of narcotic induced constipation. Will get Xray to assess constipation. If positive, could be cause of abdominal pain.   - Will get physician input.   -     X-Ray Abdomen Flat And Erect; Future; Expected date: 12/20/2017  -     pantoprazole (PROTONIX) 40 MG tablet; Take 1 tablet (40 mg total) by mouth once daily.  " Dispense: 30 tablet; Refill: 11    Follow up to be determined after imaging.     Thank you so much for allowing me to participate in the care of KELSEY Andrea

## 2018-02-15 DIAGNOSIS — R10.12 LUQ ABDOMINAL PAIN: Primary | ICD-10-CM

## 2018-02-15 DIAGNOSIS — R07.89 ATYPICAL CHEST PAIN: ICD-10-CM

## 2018-02-15 RX ORDER — SODIUM, POTASSIUM,MAG SULFATES 17.5-3.13G
SOLUTION, RECONSTITUTED, ORAL ORAL
Qty: 354 ML | Refills: 0 | Status: ON HOLD | OUTPATIENT
Start: 2018-02-15 | End: 2018-03-20 | Stop reason: HOSPADM

## 2018-02-16 NOTE — PROGRESS NOTES
Case discussed with Dr. Morfin and agrees that we should proceed with and upper endoscopy and colonoscopy to further evaluate his abdominal complaints. Please schedule EGD and Colonoscopy with suprep continues with abdominal complaints. Bowel prep sent to [facility] in [place].

## 2018-02-19 ENCOUNTER — TELEPHONE (OUTPATIENT)
Dept: GASTROENTEROLOGY | Facility: CLINIC | Age: 48
End: 2018-02-19

## 2018-03-20 ENCOUNTER — ANESTHESIA (OUTPATIENT)
Dept: ENDOSCOPY | Facility: HOSPITAL | Age: 48
End: 2018-03-20
Payer: COMMERCIAL

## 2018-03-20 ENCOUNTER — SURGERY (OUTPATIENT)
Age: 48
End: 2018-03-20

## 2018-03-20 ENCOUNTER — HOSPITAL ENCOUNTER (OUTPATIENT)
Facility: HOSPITAL | Age: 48
Discharge: HOME | End: 2018-03-20
Attending: FAMILY MEDICINE | Admitting: FAMILY MEDICINE
Payer: COMMERCIAL

## 2018-03-20 ENCOUNTER — ANESTHESIA EVENT (OUTPATIENT)
Dept: ENDOSCOPY | Facility: HOSPITAL | Age: 48
End: 2018-03-20
Payer: COMMERCIAL

## 2018-03-20 DIAGNOSIS — R10.13 EPIGASTRIC PAIN: ICD-10-CM

## 2018-03-20 DIAGNOSIS — T40.605A NARCOTIC BOWEL SYNDROME DUE TO THERAPEUTIC USE: ICD-10-CM

## 2018-03-20 DIAGNOSIS — K29.00 ACUTE SUPERFICIAL GASTRITIS WITHOUT HEMORRHAGE: ICD-10-CM

## 2018-03-20 DIAGNOSIS — K63.5 POLYP OF COLON, UNSPECIFIED PART OF COLON, UNSPECIFIED TYPE: ICD-10-CM

## 2018-03-20 DIAGNOSIS — K63.89 NARCOTIC BOWEL SYNDROME DUE TO THERAPEUTIC USE: ICD-10-CM

## 2018-03-20 DIAGNOSIS — K29.80 DUODENITIS: ICD-10-CM

## 2018-03-20 DIAGNOSIS — K59.03 DRUG-INDUCED CONSTIPATION: Primary | ICD-10-CM

## 2018-03-20 DIAGNOSIS — K59.00 CONSTIPATION: ICD-10-CM

## 2018-03-20 PROCEDURE — 63600175 PHARM REV CODE 636 W HCPCS: Performed by: NURSE ANESTHETIST, CERTIFIED REGISTERED

## 2018-03-20 PROCEDURE — 27201012 HC FORCEPS, HOT/COLD, DISP: Performed by: FAMILY MEDICINE

## 2018-03-20 PROCEDURE — 88305 TISSUE EXAM BY PATHOLOGIST: CPT | Mod: 26,,, | Performed by: PATHOLOGY

## 2018-03-20 PROCEDURE — 25000003 PHARM REV CODE 250: Performed by: FAMILY MEDICINE

## 2018-03-20 PROCEDURE — 45385 COLONOSCOPY W/LESION REMOVAL: CPT | Mod: ,,, | Performed by: FAMILY MEDICINE

## 2018-03-20 PROCEDURE — 43239 EGD BIOPSY SINGLE/MULTIPLE: CPT | Performed by: FAMILY MEDICINE

## 2018-03-20 PROCEDURE — 37000008 HC ANESTHESIA 1ST 15 MINUTES: Performed by: FAMILY MEDICINE

## 2018-03-20 PROCEDURE — 25000003 PHARM REV CODE 250: Performed by: NURSE ANESTHETIST, CERTIFIED REGISTERED

## 2018-03-20 PROCEDURE — 43239 EGD BIOPSY SINGLE/MULTIPLE: CPT | Mod: 59,,, | Performed by: FAMILY MEDICINE

## 2018-03-20 PROCEDURE — 27201089 HC SNARE, DISP (ANY): Performed by: FAMILY MEDICINE

## 2018-03-20 PROCEDURE — 45385 COLONOSCOPY W/LESION REMOVAL: CPT | Performed by: FAMILY MEDICINE

## 2018-03-20 PROCEDURE — 88305 TISSUE EXAM BY PATHOLOGIST: CPT | Performed by: PATHOLOGY

## 2018-03-20 PROCEDURE — 37000009 HC ANESTHESIA EA ADD 15 MINS: Performed by: FAMILY MEDICINE

## 2018-03-20 RX ORDER — SUCRALFATE 1 G/1
1 TABLET ORAL 4 TIMES DAILY
Qty: 120 TABLET | Refills: 1 | Status: SHIPPED | OUTPATIENT
Start: 2018-03-20 | End: 2018-05-18 | Stop reason: SDUPTHER

## 2018-03-20 RX ORDER — LIDOCAINE HCL/PF 100 MG/5ML
SYRINGE (ML) INTRAVENOUS
Status: DISCONTINUED | OUTPATIENT
Start: 2018-03-20 | End: 2018-03-20

## 2018-03-20 RX ORDER — SODIUM CHLORIDE, SODIUM LACTATE, POTASSIUM CHLORIDE, CALCIUM CHLORIDE 600; 310; 30; 20 MG/100ML; MG/100ML; MG/100ML; MG/100ML
INJECTION, SOLUTION INTRAVENOUS CONTINUOUS
Status: DISCONTINUED | OUTPATIENT
Start: 2018-03-20 | End: 2018-03-20 | Stop reason: HOSPADM

## 2018-03-20 RX ORDER — PROPOFOL 10 MG/ML
VIAL (ML) INTRAVENOUS
Status: DISCONTINUED | OUTPATIENT
Start: 2018-03-20 | End: 2018-03-20

## 2018-03-20 RX ADMIN — LIDOCAINE HYDROCHLORIDE 80 ML: 20 INJECTION, SOLUTION INTRAVENOUS at 10:03

## 2018-03-20 RX ADMIN — PROPOFOL 50 MG: 10 INJECTION, EMULSION INTRAVENOUS at 11:03

## 2018-03-20 RX ADMIN — PROPOFOL 80 MG: 10 INJECTION, EMULSION INTRAVENOUS at 10:03

## 2018-03-20 RX ADMIN — SODIUM CHLORIDE, SODIUM LACTATE, POTASSIUM CHLORIDE, AND CALCIUM CHLORIDE: 600; 310; 30; 20 INJECTION, SOLUTION INTRAVENOUS at 10:03

## 2018-03-20 RX ADMIN — SODIUM CHLORIDE, SODIUM LACTATE, POTASSIUM CHLORIDE, AND CALCIUM CHLORIDE: 600; 310; 30; 20 INJECTION, SOLUTION INTRAVENOUS at 11:03

## 2018-03-20 RX ADMIN — BENZOCAINE, BUTAMBEN, AND TETRACAINE HYDROCHLORIDE 1 SPRAY: .028; .004; .004 AEROSOL, SPRAY TOPICAL at 10:03

## 2018-03-20 NOTE — ANESTHESIA RELEASE NOTE
"Anesthesia Release from PACU Note    Patient: Ken Peter    Procedure(s) Performed: Procedure(s) (LRB):  COLONOSCOPY (N/A)  ESOPHAGOGASTRODUODENOSCOPY (EGD) (N/A)    Anesthesia type: MAC    Post pain: Adequate analgesia    Post assessment: no apparent anesthetic complications, tolerated procedure well and no evidence of recall    Last Vitals:   Visit Vitals  /80 (BP Location: Left arm, Patient Position: Lying)   Pulse 65   Temp 36.8 °C (98.3 °F) (Oral)   Resp 18   Ht 6' 1" (1.854 m)   Wt 118.8 kg (262 lb)   SpO2 99%   BMI 34.57 kg/m²       Post vital signs: stable    Level of consciousness: awake, alert  and oriented    Nausea/Vomiting: no nausea/no vomiting    Complications: none    Airway Patency: patent    Respiratory: unassisted, spontaneous ventilation, room air    Cardiovascular: stable and blood pressure at baseline    Hydration: euvolemic  "

## 2018-03-20 NOTE — PROVATION PATIENT INSTRUCTIONS
Discharge Summary/Instructions after an Endoscopic Procedure  Patient Name: Ken Peter  Patient MRN: [id number]  Patient YOB: 1970 Tuesday, March 20, 2018 Rey Edmonds MD  RESTRICTIONS:  During your procedure today, you received medications for sedation.  These   medications may affect your judgment, balance and coordination.  Therefore,   for 24 hours, you have the following restrictions:   - DO NOT drive a car, operate machinery, make legal/financial decisions,   sign important papers or drink alcohol.    ACTIVITY:  The following day: return to full activity including work, except no heavy   lifting, straining or running for 3 days if polyps were removed.  DIET:  Eat and drink normally unless instructed otherwise.     TREATMENT FOR COMMON SIDE EFFECTS:  - Mild abdominal pain, nausea, belching, bloating or excessive gas:  rest,   eat lightly and use a heating pad.  - Sore Throat: treat with throat lozenges and/or gargle with warm salt   water.  - Because air was used during the procedure, expelling large amounts of air   from your rectum or belching is normal.  - If a bowel prep was taken, you may not have a bowel movement for 1-3 days.    This is normal.  SYMPTOMS TO WATCH FOR AND REPORT TO YOUR PHYSICIAN:  1. Abdominal pain or bloating, other than gas cramps.  2. Chest pain.  3. Back pain.  4. Signs of infection such as: chills or fever occurring within 24 hours   after the procedure.  5. Rectal bleeding, which would show as bright red, maroon, or black stools.   (A tablespoon of blood from the rectum is not serious, especially if   hemorrhoids are present.)  6. Vomiting.  7. Weakness or dizziness.  GO DIRECTLY TO THE NEAREST EMERGENCY ROOM IF YOU HAVE ANY OF THE FOLLOWING:      Difficulty breathing              Chills and/or fever over 101 F   Persistent vomiting and/or vomiting blood   Severe abdominal pain   Severe chest pain   Black, tarry stools   Bleeding- more than one tablespoon   Any  other symptom or condition that you feel may need urgent attention  Your doctor recommends these additional instructions:  If any biopsies were taken, your doctors clinic will contact you in 1 to 2   weeks with any results.  You have a contact number available for emergencies.  The signs and symptoms   of potential delayed complications were discussed with you.  You may return   to normal activities tomorrow.  Written discharge instructions were   provided to you.   The patient should eat a bland diet and avoid caffeine, carbonation,   alcohol, nicotine, aspirin and NSAID's including ibuprofen and advil.    Take Protonix (pantoprazole) 40 mg by mouth once a day daily.   Take Carafate (sucralfate) tablets 1 gram by mouth four times a day for two   months.  For questions, problems or results please call your physician Rey Edmonds MD at Work:  [phone]  If you have any questions about the above instructions, call the GI   department at [phone] or call the endoscopy unit at [phone]   from 7am until 3 pm.  [facility], EMERGENCY ROOM PHONE NUMBER:   [phone]  IF A COMPLICATION OR EMERGENCY SITUATION ARISES AND YOU ARE UNABLE TO REACH   YOUR PHYSICIAN - GO DIRECTLY TO THE EMERGENCY ROOM.  I have read or have had read to me these discharge instructions for my   procedure and have received a written copy.  I understand these   instructions and will follow-up with my physician if I have any questions.     __________________________________       _____________________________________  Nurse Signature                                          Patient/Designated   Responsible Party Signature  Rey Edmonds MD  3/20/2018 11:26:50 AM  This report has been verified and signed electronically.

## 2018-03-20 NOTE — DISCHARGE SUMMARY
Endoscopy Discharge Summary      Admit Date: 3/20/2018    Discharge Date and Time:  3/20/2018 11:31 AM    Attending Physician: Rey Edmonds MD     Discharge Physician: Rey Edmonds MD     Principal Admitting Diagnoses: Constipation         Discharge Diagnosis: The primary encounter diagnosis was Drug-induced constipation. 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     Discharged Condition: Good    Indication for Admission: Constipation     Hospital Course: Patient was admitted for an inpatient procedure and tolerated the procedure well with no complications.    Significant Diagnostic Studies: EGD with cold biopsy and Colonoscopy with hot snare polypectomy    Pathology (if any):  Specimen (12h ago through future)    Start     Ordered    03/20/18 1101  Specimen to Pathology - Surgery  Once     Comments:  #1 duodenitis, duodenal bulb bx#2 gastritis, antral bx, r/o h.pylori#3 ascending polyps x2, 1mm and 2mm sessile      03/20/18 1124          Estimated Blood Loss: 1 ml.    Discussed with: patient and family.    Disposition: Home.    Follow Up/Patient Instructions:   Current Discharge Medication List      START taking these medications    Details   sucralfate (CARAFATE) 1 gram tablet Take 1 tablet (1 g total) by mouth 4 (four) times daily.  Qty: 120 tablet, Refills: 1         CONTINUE these medications which have NOT CHANGED    Details   fluticasone (FLONASE) 50 mcg/actuation nasal spray       gabapentin (NEURONTIN) 400 MG capsule Take 400 mg by mouth 3 (three) times daily.      oxycodone-acetaminophen (PERCOCET)  mg per tablet Take 1 tablet by mouth 3 (three) times daily as needed for Pain.      pravastatin (PRAVACHOL) 40 MG tablet Take 40 mg by mouth once daily.  Refills: 5      zolpidem (AMBIEN CR)  12.5 MG CR tablet Take 12.5 mg by mouth nightly as needed.      armodafinil (NUVIGIL) 150 mg tablet Take 150 mg by mouth once daily.      docusate sodium (COLACE) 100 MG capsule Take 1 capsule (100 mg total) by mouth 2 (two) times daily as needed for Constipation.  Qty: 30 capsule, Refills: 0      lubiprostone (AMITIZA) 24 MCG Cap Take 1 capsule (24 mcg total) by mouth 2 (two) times daily.  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      pantoprazole (PROTONIX) 40 MG tablet Take 1 tablet (40 mg total) by mouth once daily.  Qty: 30 tablet, Refills: 11      phentermine (ADIPEX-P) 37.5 mg tablet Take 37.5 mg by mouth once daily.      polyethylene glycol (GLYCOLAX) 17 gram/dose powder Take 17 g by mouth once daily. Prn constipation  Qty: 238 g, Refills: 0      tadalafil (CIALIS) 20 MG Tab Take 1 tablet (20 mg total) by mouth once daily.  Qty: 5 tablet, Refills: 11         STOP taking these medications       aspirin (ECOTRIN) 81 MG EC tablet Comments:   Reason for Stopping:         naproxen (NAPROSYN) 500 MG tablet Comments:   Reason for Stopping:         SUPREP BOWEL PREP KIT 17.5-3.13-1.6 gram SolR Comments:   Reason for Stopping:                 Discharge Procedure Orders  Diet general     Activity as tolerated     Call MD for:  temperature >100.4     Call MD for:  persistent nausea and vomiting     Call MD for:  severe uncontrolled pain     Call MD for:  difficulty breathing, headache or visual disturbances     Call MD for:  redness, tenderness, or signs of infection (pain, swelling, redness, odor or green/yellow discharge around incision site)     Call MD for:  hives     Call MD for:  persistent dizziness or light-headedness     No dressing needed         Follow-up Information     Rey Edmonds MD. Call in 1 week.     Specialty:  Family Medicine  Why:  To receive pathology results.  Contact information:  [address] [facility] [place] [address] [phone]                   [email or web address]

## 2018-03-20 NOTE — DISCHARGE INSTRUCTIONS
Understanding Colon and Rectal Polyps    The colon (also called the large intestine) is a muscular tube that forms the last part of the digestive tract. It absorbs water and stores food waste. The colon is about 4 to 6 feet long. The rectum is the last 6 inches of the colon. The colon and rectum have a smooth lining composed of millions of cells. Changes in these cells can lead to growths in the colon that can become cancerous and should be removed. Multiple tests are available to screen for colon cancer, but the colonoscopy is the most recommended test. During colonoscopy, these polyps can be removed. How often you need this test depends on many things including your condition, your family history, symptoms, and what the findings were at the previous colonoscopy.   When the colon lining changes  Changes that happen in the cells that line the colon or rectum can lead to growths called polyps. Over a period of years, polyps can turn cancerous. Removing polyps early may prevent cancer from ever forming.  Polyps  Polyps are fleshy clumps of tissue that form on the lining of the colon or rectum. Small polyps are usually benign (not cancerous). However, over time, cells in a polyp can change and become cancerous. Certain types of polyps known as adenomatous polyps are premalignant. The risk for invasive cancer increases with the size of the polyp and certain cell and gene features. This means that they can become cancerous if they're not removed. Hyperplastic polyps are benign. They can grow quite large and not turn cancerous.   Cancer  Almost all colorectal cancers start when polyp cells begin growing abnormally. As a cancerous tumor grows, it may involve more and more of the colon or rectum. In time, cancer can also grow beyond the colon or rectum and spread to nearby organs or to glands called lymph nodes. The cells can also travel to other parts of the body. This is known as metastasis. The earlier a cancerous  tumor is removed, the better the chance of preventing its spread.    Date Last Reviewed: 8/1/2016  © 1274-3053 [employer]. [address] [place] [address], [place] [address]. All rights reserved. This information is not intended as a substitute for professional medical care. Always follow your healthcare professional's instructions.        Duodenitis    The duodenum is the first part of the small intestine, just past the stomach. Duodenitis is inflammation of the lining of the duodenum. This sheet tells you more about the condition.  Causes of duodenitis  The most common cause of duodenitis is infection by Helicobacter pylori (H. pylori) bacteria. Another common cause is long-term use of NSAIDs (such as aspirin and ibuprofen). Celiac disease, an allergy to gluten, causes a particular type of inflammation in the duodenum along with other changes. Less commonly, duodenitis happens along with another health problem, such as Crohn's disease. Drinking alcohol, smoking, or taking certain medicines also may make duodenitis more likely to happen.   Symptoms of duodenitis  The condition may cause no symptoms. If symptoms do happen, they can include:  · Burning, cramping, or hunger-like pain in your stomach  · Gas or a bloated feeling  · Nausea and vomiting  · Feeling full soon after starting a meal  Diagnosing duodenitis  Here is what will be done to diagnose duodenitis:  · If duodenitis is suspected, an upper endoscopy with biopsy will be done to confirm it. During this test, a thin, flexible tube with a light and camera on the end (endoscope) is used. The scope is moved down your throat to the stomach and into the duodenum. The scope sends images of the duodenum to a video screen. Small samples (biopsy) of the lining of the duodenum may be taken. These samples may be sent to a lab for testing for H. pylori.  · To check for H. pylori or other pathogens, a blood, stool, gastric biopsy, or breath test may be done.  Samples of blood or stool are taken and tested in a lab. For a breath test, you swallow a harmless compound. If H. pylori bacteria are present, extra carbon dioxide gas can then be detected in your breath.  · To check for celiac disease, blood tests may be done. If positive, an upper endoscopy with biopsy is usually done to confirm the diagnosis.   · In rare cases, an upper gastrointestinal (GI) series is done. This gives more information about the digestive tract. This procedure takes X-rays of the upper GI tract from the mouth to the small intestine.  Treating duodenitis  Duodenitis is treated using one or more of the following:  · Antibiotic medicines to kill H. pylori  · Medicines to reduce the amount of acid the stomach makes  · Stopping NSAIDs such as aspirin and ibuprofen. However, if you take aspirin for a medical condition, such as heart disease or stroke, do not stop until you check with your prescribing healthcare provider. If you take NSAIDs for arthritis or pain, check with your healthcare provider about alternatives.  · Adopting a gluten-free diet if celiac disease is the cause.  · Avoiding alcohol  · Stopping smoking  Your healthcare provider can tell you more about what treatments are needed.  Recovery and follow-up  With treatment, most cases of duodenitis clear up completely. In rare cases, duodenitis can be an ongoing (chronic) problem or can develop into a duodenal ulcer. If your symptoms do not improve or if they go away and come back, let your healthcare provider know. In such cases, regular healthcare provider visits and treatments are needed to manage your condition.   When to call the healthcare provider  Call your healthcare provider right away if you have any of the following:  · Fever of 100.4°F (38.0°C) or higher, or as advised by your healthcare provider  · Nausea or vomiting (vomit may be bloody or look like coffee grounds)  · Dark, tarry, or bloody stools  · Sudden or severe stomach  pain  · Pain that does not improve with treatment  · Rapid weight loss   Date Last Reviewed: 7/1/2016  © 1662-4951 [employer], [employer]. [address] [place] [address], [place] [address]. All rights reserved. This information is not intended as a substitute for professional medical care. Always follow your healthcare professional's instructions.        Gastritis (Adult)    Gastritis is inflammation and irritation of the stomach lining. It can be present for a short time (acute) or be long lasting (chronic). Gastritis is often caused by infection with bacteria called H pylori. More than a third of people in the [place] have this bacteria in their bodies. In many cases, H pylori causes no problems or symptoms. In some people, though, the infection irritates the stomach lining and causes gastritis. Other causes of stomach irritation include drinking alcohol or taking pain-relieving medicines called NSAIDs (such as aspirin or ibuprofen).   Symptoms of gastritis can include:  · Abdominal pain or bloating  · Loss of appetite  · Nausea or vomiting  · Vomiting blood or having black stools  · Feeling more tired than usual  An inflamed and irritated stomach lining is more likely to develop a sore called an ulcer. To help prevent this, gastritis should be treated.  Home care  If needed, medicines may be prescribed. If you have H pylori infection, treating it will likely relieve your symptoms. Other changes can help reduce stomach irritation and help it heal.  · If you have been prescribed medicines for H pylori infection, take them as directed. Take all of the medicine until it is finished or your healthcare provider tells you to stop, even if you feel better.  · Your healthcare provider may recommend avoiding NSAIDs. If you take daily aspirin for your heart or other medical reasons, do not stop without talking to your healthcare provider first.  · Avoid drinking alcohol.  · Stop smoking. Smoking can irritate the stomach and delay  healing. As much as possible, stay away from second hand smoke.  Follow-up care  Follow up with your healthcare provider, or as advised by our staff. Testing may be needed to check for inflammation or an ulcer.  When to seek medical advice  Call your healthcare provider for any of the following:  · Stomach pain that gets worse or moves to the lower right abdomen (appendix area)  · Chest pain that appears or gets worse, or spreads to the back, neck, shoulder, or arm  · Frequent vomiting (cant keep down liquids)  · Blood in the stool or vomit (red or black in color)  · Feeling weak or dizzy  · Fever of 100.4ºF (38ºC) or higher, or as directed by your healthcare provider  Date Last Reviewed: 6/22/2015 © 2000-2017 [employer]. [address] [place] [address], [place] [address]. All rights reserved. This information is not intended as a substitute for professional medical care. Always follow your healthcare professional's instructions.

## 2018-03-20 NOTE — PROVATION PATIENT INSTRUCTIONS
Discharge Summary/Instructions after an Endoscopic Procedure  Patient Name: Ken Peter  Patient MRN: [id number]  Patient YOB: 1970 Tuesday, March 20, 2018 Rey Edmonds MD  RESTRICTIONS:  During your procedure today, you received medications for sedation.  These   medications may affect your judgment, balance and coordination.  Therefore,   for 24 hours, you have the following restrictions:   - DO NOT drive a car, operate machinery, make legal/financial decisions,   sign important papers or drink alcohol.    ACTIVITY:  The following day: return to full activity including work, except no heavy   lifting, straining or running for 3 days if polyps were removed.  DIET:  Eat and drink normally unless instructed otherwise.     TREATMENT FOR COMMON SIDE EFFECTS:  - Mild abdominal pain, nausea, belching, bloating or excessive gas:  rest,   eat lightly and use a heating pad.  - Sore Throat: treat with throat lozenges and/or gargle with warm salt   water.  - Because air was used during the procedure, expelling large amounts of air   from your rectum or belching is normal.  - If a bowel prep was taken, you may not have a bowel movement for 1-3 days.    This is normal.  SYMPTOMS TO WATCH FOR AND REPORT TO YOUR PHYSICIAN:  1. Abdominal pain or bloating, other than gas cramps.  2. Chest pain.  3. Back pain.  4. Signs of infection such as: chills or fever occurring within 24 hours   after the procedure.  5. Rectal bleeding, which would show as bright red, maroon, or black stools.   (A tablespoon of blood from the rectum is not serious, especially if   hemorrhoids are present.)  6. Vomiting.  7. Weakness or dizziness.  GO DIRECTLY TO THE NEAREST EMERGENCY ROOM IF YOU HAVE ANY OF THE FOLLOWING:      Difficulty breathing              Chills and/or fever over 101 F   Persistent vomiting and/or vomiting blood   Severe abdominal pain   Severe chest pain   Black, tarry stools   Bleeding- more than one tablespoon   Any  other symptom or condition that you feel may need urgent attention  Your doctor recommends these additional instructions:  If any biopsies were taken, your doctors clinic will contact you in 1 to 2   weeks with any results.  You have a contact number available for emergencies.  The signs and symptoms   of potential delayed complications were discussed with you.  You may return   to normal activities tomorrow.  Written discharge instructions were   provided to you.   Resume your previous diet.   Continue your present medications.   We are waiting for your pathology results.   Your physician has recommended a repeat colonoscopy in five years for   surveillance.   Telephone my office for pathology results in one week.  For questions, problems or results please call your physician Rey Edmonds MD at Work:  [phone]  If you have any questions about the above instructions, call the GI   department at [phone] or call the endoscopy unit at [phone]   from 7am until 3 pm.  [facility] [place], EMERGENCY ROOM PHONE NUMBER:   [phone]  IF A COMPLICATION OR EMERGENCY SITUATION ARISES AND YOU ARE UNABLE TO REACH   YOUR PHYSICIAN - GO DIRECTLY TO THE EMERGENCY ROOM.  I have read or have had read to me these discharge instructions for my   procedure and have received a written copy.  I understand these   instructions and will follow-up with my physician if I have any questions.     __________________________________       _____________________________________  Nurse Signature                                          Patient/Designated   Responsible Party Signature  Rey Edmonds MD  3/20/2018 11:24:42 AM  This report has been verified and signed electronically.

## 2018-03-20 NOTE — ANESTHESIA POSTPROCEDURE EVALUATION
"Anesthesia Post Evaluation    Patient: Ken Peter    Procedure(s) Performed: Procedure(s) (LRB):  COLONOSCOPY (N/A)  ESOPHAGOGASTRODUODENOSCOPY (EGD) (N/A)    Final Anesthesia Type: MAC  Patient location during evaluation: GI PACU  Patient participation: Yes- Able to Participate  Level of consciousness: awake and alert  Post-procedure vital signs: reviewed and stable  Pain management: adequate  Airway patency: patent  PONV status at discharge: No PONV  Anesthetic complications: no      Cardiovascular status: blood pressure returned to baseline  Respiratory status: unassisted, spontaneous ventilation and room air  Hydration status: euvolemic  Follow-up not needed.        Visit Vitals  /80 (BP Location: Left arm, Patient Position: Lying)   Pulse 65   Temp 36.8 °C (98.3 °F) (Oral)   Resp 18   Ht 6' 1" (1.854 m)   Wt 118.8 kg (262 lb)   SpO2 99%   BMI 34.57 kg/m²       Pain/Myra Score: Pain Assessment Performed: Yes (3/20/2018 10:12 AM)  Presence of Pain: complains of pain/discomfort (3/20/2018 10:05 AM)      "

## 2018-03-20 NOTE — H&P
Short Stay Endoscopy History and Physical    PCP - Sergio Escamilla MD    Procedure - EGD and colonoscopy  ASA -2  Mallampati - per anesthesia  History of Anesthesia problems - no  Family history Anesthesia problems -  no     HPI:  This is a 48 y.o. 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         Health Maintenance       Date Due Completion Date    Lipid Panel 1970 ---    TETANUS VACCINE 02/19/1988 ---    Influenza Vaccine 08/01/2017 ---    Colonoscopy 11/13/2024 11/13/2014          EGD  Reflux - no  Dysphagia - no  Abdominal pain - yes- it is in the epigastric area.  Diarrhea - no  Anemia - no  GI bleeding - no  Other - no    Colonoscopy  Screening - no  History of polyps - no  Diarrhea - no  Anemia - no  Blood in stools - no  Abdominal pain - yes- it is in the epigastric area.  Other - no    ROS:  CONSTITUTIONAL: Denies weight change,  fatigue, fevers, chills, night sweats.  CARDIOVASCULAR: Denies chest pain, shortness of breath, orthopnea and edema.  RESPIRATORY: Denies cough, hemoptysis, dyspnea, and wheezing.  GI: See HPI.    Medical History:   Past Medical History:   Diagnosis Date    Chronic back pain     Hyperlipidemia     Sleep apnea     Urolithiasis        Surgical History:   Past Surgical History:   Procedure Laterality Date    back surgery x8 with instrumentation      bilateral shoulder arthroscopy      ESWL x2 last in 1999         Family History:   Family History   Problem Relation Age of Onset    Diabetes Mother     No Known Problems Father     Colon cancer Neg Hx     Stomach cancer Neg Hx        Social History:   Social History   Substance Use Topics    Smoking status: Former Smoker     Packs/day: 0.50      Years: 20.00     Quit date: 9/30/2002    Smokeless tobacco: Never Used    Alcohol use No       Allergies:   Review of patient's allergies indicates:  No Known Allergies    Medications:   No current facility-administered medications on file prior to encounter.      Current Outpatient Prescriptions on File Prior to Encounter   Medication Sig Dispense Refill    aspirin (ECOTRIN) 81 MG EC tablet Take 81 mg by mouth once daily.      fluticasone (FLONASE) 50 mcg/actuation nasal spray       gabapentin (NEURONTIN) 400 MG capsule Take 400 mg by mouth 3 (three) times daily.      naproxen (NAPROSYN) 500 MG tablet Take 1 tablet (500 mg total) by mouth 2 (two) times daily with meals. Pain 10 tablet 0    oxycodone-acetaminophen (PERCOCET)  mg per tablet Take 1 tablet by mouth 3 (three) times daily as needed for Pain.      pravastatin (PRAVACHOL) 40 MG tablet Take 40 mg by mouth once daily.  5    zolpidem (AMBIEN CR) 12.5 MG CR tablet Take 12.5 mg by mouth nightly as needed.      armodafinil (NUVIGIL) 150 mg tablet Take 150 mg by mouth once daily.      docusate sodium (COLACE) 100 MG capsule Take 1 capsule (100 mg total) by mouth 2 (two) times daily as needed for Constipation. 30 capsule 0    lubiprostone (AMITIZA) 24 MCG Cap Take 1 capsule (24 mcg total) by mouth 2 (two) times daily. 60 capsule 11    methocarbamol (ROBAXIN) 500 MG Tab Take 500 mg by mouth nightly.      pantoprazole (PROTONIX) 40 MG tablet Take 1 tablet (40 mg total) by mouth once daily. 30 tablet 11    phentermine (ADIPEX-P) 37.5 mg tablet Take 37.5 mg by mouth once daily.      polyethylene glycol (GLYCOLAX) 17 gram/dose powder Take 17 g by mouth once daily. Prn constipation 238 g 0    SUPREP BOWEL PREP KIT 17.5-3.13-1.6 gram SolR Use as directed 354 mL 0    tadalafil (CIALIS) 20 MG Tab Take 1 tablet (20 mg total) by mouth once daily. 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         Plan:   I have explained the risks and benefits of upper endoscopy and colonoscopy to the patient including but not limited to bleeding, perforation, infection, and death. The patient wishes to proceed.

## 2018-03-20 NOTE — ANESTHESIA PREPROCEDURE EVALUATION
03/20/2018  Ken Peter is a 48 y.o., male.    Pre-op Assessment    I have reviewed the Patient Summary Reports.     I have reviewed the Nursing Notes.   I have reviewed the Medications.     Review of Systems  Anesthesia Hx:  History of prior surgery of interest to airway management or planning: Previous anesthesia: General, MAC Denies Family Hx of Anesthesia complications.   Denies Personal Hx of Anesthesia complications.   Social:  Former Smoker Quit 2002. Total of 8 years of about 1/2 ppd.   Cardiovascular:   Exercise tolerance: good Cardiac stress test over 5 years ago with normal results and no new interventions.  Hyperlipidemia   Pulmonary:   Sleep Apnea, CPAP Snore   Renal/:   renal calculi    Hepatic/GI:   Bowel Prep.    Musculoskeletal:  Musculoskeletal Normal    Endocrine:  Endocrine Normal    Psych:  Psychiatric Normal           Physical Exam  General:  Well nourished, Obesity    Airway/Jaw/Neck:  Airway Findings: Mouth Opening: Normal Tongue: Normal  General Airway Assessment: Adult  Mallampati: IV  Improves to III with phonation.  TM Distance: Normal, at least 6 cm       Chest/Lungs:  Chest/Lungs Findings: Normal Respiratory Rate     Heart/Vascular:  Heart Findings: Rate: Normal             Anesthesia Plan  Type of Anesthesia, risks & benefits discussed:  Anesthesia Type:  MAC  Patient's Preference:   Intra-op Monitoring Plan: standard ASA monitors  Intra-op Monitoring Plan Comments:   Post Op Pain Control Plan:   Post Op Pain Control Plan Comments:   Induction:   IV  Beta Blocker:  Patient is not currently on a Beta-Blocker (No further documentation required).       Informed Consent: Patient understands risks and agrees with Anesthesia plan.  Questions answered. Anesthesia consent signed with patient.  ASA Score: 2     Day of Surgery Review of History & Physical: I have interviewed and  examined the patient. I have reviewed the patient's H&P dated: 11/13/14. There are no significant changes.  H&P update referred to the provider.         Ready For Surgery From Anesthesia Perspective.

## 2018-03-21 VITALS
BODY MASS INDEX: 34.72 KG/M2 | OXYGEN SATURATION: 97 % | WEIGHT: 262 LBS | DIASTOLIC BLOOD PRESSURE: 92 MMHG | RESPIRATION RATE: 20 BRPM | TEMPERATURE: 98 F | HEIGHT: 73 IN | HEART RATE: 63 BPM | SYSTOLIC BLOOD PRESSURE: 127 MMHG

## 2018-05-18 RX ORDER — SUCRALFATE 1 G/1
1 TABLET ORAL 4 TIMES DAILY
Qty: 120 TABLET | Refills: 1 | Status: SHIPPED | OUTPATIENT
Start: 2018-05-18 | End: 2018-07-17

## 2018-07-31 RX ORDER — SUCRALFATE 1 G/1
TABLET ORAL
Qty: 120 TABLET | Refills: 1 | OUTPATIENT
Start: 2018-07-31

## 2019-07-29 ENCOUNTER — TELEPHONE (OUTPATIENT)
Dept: UROLOGY | Facility: CLINIC | Age: 49
End: 2019-07-29

## 2019-07-31 ENCOUNTER — OFFICE VISIT (OUTPATIENT)
Dept: UROLOGY | Facility: CLINIC | Age: 49
End: 2019-07-31
Payer: COMMERCIAL

## 2019-07-31 ENCOUNTER — TELEPHONE (OUTPATIENT)
Dept: UROLOGY | Facility: CLINIC | Age: 49
End: 2019-07-31

## 2019-07-31 VITALS
DIASTOLIC BLOOD PRESSURE: 82 MMHG | WEIGHT: 267 LBS | HEIGHT: 73 IN | BODY MASS INDEX: 35.39 KG/M2 | SYSTOLIC BLOOD PRESSURE: 134 MMHG

## 2019-07-31 DIAGNOSIS — R10.9 FLANK PAIN: Primary | ICD-10-CM

## 2019-07-31 LAB
BILIRUB SERPL-MCNC: NORMAL MG/DL
BLOOD URINE, POC: NORMAL
COLOR, POC UA: NORMAL
GLUCOSE UR QL STRIP: NORMAL
KETONES UR QL STRIP: NORMAL
LEUKOCYTE ESTERASE URINE, POC: NORMAL
NITRITE, POC UA: NORMAL
PH, POC UA: 5
PROTEIN, POC: NORMAL
SPECIFIC GRAVITY, POC UA: 1.02
UROBILINOGEN, POC UA: NORMAL

## 2019-07-31 PROCEDURE — 3008F PR BODY MASS INDEX (BMI) DOCUMENTED: ICD-10-PCS | Mod: CPTII,S$GLB,, | Performed by: UROLOGY

## 2019-07-31 PROCEDURE — 99203 PR OFFICE/OUTPT VISIT, NEW, LEVL III, 30-44 MIN: ICD-10-PCS | Mod: 25,S$GLB,, | Performed by: UROLOGY

## 2019-07-31 PROCEDURE — 99999 PR PBB SHADOW E&M-EST. PATIENT-LVL III: ICD-10-PCS | Mod: PBBFAC,,, | Performed by: UROLOGY

## 2019-07-31 PROCEDURE — 99999 PR PBB SHADOW E&M-EST. PATIENT-LVL III: CPT | Mod: PBBFAC,,, | Performed by: UROLOGY

## 2019-07-31 PROCEDURE — 81002 URINALYSIS NONAUTO W/O SCOPE: CPT | Mod: S$GLB,,, | Performed by: UROLOGY

## 2019-07-31 PROCEDURE — 81002 POCT URINE DIPSTICK WITHOUT MICROSCOPE: ICD-10-PCS | Mod: S$GLB,,, | Performed by: UROLOGY

## 2019-07-31 PROCEDURE — 3008F BODY MASS INDEX DOCD: CPT | Mod: CPTII,S$GLB,, | Performed by: UROLOGY

## 2019-07-31 PROCEDURE — 99203 OFFICE O/P NEW LOW 30 MIN: CPT | Mod: 25,S$GLB,, | Performed by: UROLOGY

## 2019-07-31 RX ORDER — ATORVASTATIN CALCIUM 40 MG/1
TABLET, FILM COATED ORAL
COMMUNITY
Start: 2019-07-29
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
